# Patient Record
Sex: MALE | Race: WHITE | Employment: FULL TIME | URBAN - METROPOLITAN AREA
[De-identification: names, ages, dates, MRNs, and addresses within clinical notes are randomized per-mention and may not be internally consistent; named-entity substitution may affect disease eponyms.]

---

## 2017-01-10 ENCOUNTER — OFFICE VISIT (OUTPATIENT)
Dept: FAMILY MEDICINE | Facility: CLINIC | Age: 62
End: 2017-01-10
Payer: COMMERCIAL

## 2017-01-10 VITALS
SYSTOLIC BLOOD PRESSURE: 112 MMHG | HEIGHT: 71 IN | TEMPERATURE: 98.2 F | HEART RATE: 58 BPM | BODY MASS INDEX: 26.88 KG/M2 | WEIGHT: 192 LBS | DIASTOLIC BLOOD PRESSURE: 84 MMHG | RESPIRATION RATE: 14 BRPM

## 2017-01-10 DIAGNOSIS — R39.11 URINARY HESITANCY: Primary | ICD-10-CM

## 2017-01-10 LAB
ALBUMIN UR-MCNC: NEGATIVE MG/DL
APPEARANCE UR: CLEAR
BILIRUB UR QL STRIP: NEGATIVE
COLOR UR AUTO: YELLOW
GLUCOSE UR STRIP-MCNC: NEGATIVE MG/DL
HGB UR QL STRIP: NEGATIVE
KETONES UR STRIP-MCNC: NEGATIVE MG/DL
LEUKOCYTE ESTERASE UR QL STRIP: NEGATIVE
NITRATE UR QL: NEGATIVE
PH UR STRIP: 7 PH (ref 5–7)
SP GR UR STRIP: 1.01 (ref 1–1.03)
URN SPEC COLLECT METH UR: NORMAL
UROBILINOGEN UR STRIP-ACNC: 0.2 EU/DL (ref 0.2–1)

## 2017-01-10 PROCEDURE — 84153 ASSAY OF PSA TOTAL: CPT | Performed by: PHYSICIAN ASSISTANT

## 2017-01-10 PROCEDURE — 99213 OFFICE O/P EST LOW 20 MIN: CPT | Performed by: PHYSICIAN ASSISTANT

## 2017-01-10 PROCEDURE — 81003 URINALYSIS AUTO W/O SCOPE: CPT | Performed by: PHYSICIAN ASSISTANT

## 2017-01-10 RX ORDER — TAMSULOSIN HYDROCHLORIDE 0.4 MG/1
0.4 CAPSULE ORAL DAILY
Qty: 30 CAPSULE | Refills: 1 | Status: SHIPPED | OUTPATIENT
Start: 2017-01-10 | End: 2017-03-05

## 2017-01-10 NOTE — MR AVS SNAPSHOT
After Visit Summary   1/10/2017    Pernell Moody    MRN: [id number]           Patient Information     Date Of Birth          1955        Visit Information        Provider Department      1/10/2017 10:15 AM Elmer Horvath PA-C [facility]        Today's Diagnoses     Urinary hesitancy    -  1       Care Instructions      Chronic Prostatitis/Chronic Pelvic Pain Syndrome (CP/CPPS)      With a healthy prostate, urine flows easily through the urethra.   Chronic prostatitis/chronic pelvic pain syndrome (CP/CPPS) is ongoing pain in the area of the prostate gland. The prostate gland is part of the male reproductive system. It sits just below the bladder and surrounds the urethra. The urethra is the tube that takes urine and semen out of the body. CP/CPPS is the most common form of pain of the gland. It is also known as nonbacterial prostatitis. Symptoms such as pain and trouble urinating may come and go.  Causes of CP/CPPS  The exact cause of CP/CPPS isn t known. It may be caused by an infection that comes back again and again. It may be caused by inflammation of the gland. Muscle spasms in the pelvis may be a cause. Other causes of CP/CPPS may include:    Stress that tightens the pelvic muscles    Urine flowing back up into the prostate ducts    Not ejaculating often  In many cases, the cause isn t clear.  Symptoms of CP/CPPS  Some men don t have symptoms. Or, they may have symptoms that come and go. The symptoms can include:    Pain in the genitals and pelvic area    Trouble urinating    Pain while urinating    Pain during or after ejaculation  Diagnosing CP/CPPS  Your health care provider will ask about your medical history and your symptoms. He or she may give you a physical exam, including a rectal exam. Your urine, blood, and semen may be tested for bacteria or certain chemicals. In some cases, you may have other tests. You may have a transrectal ultrasound-guided biopsy.  This is done to take tiny pieces of tissue to look at with a microscope. Or, you may have imaging tests such as a CT scan, MRI, or ultrasound scan. These are done to look at your abdomen and pelvic areas.  Treating CP/CPPS  The goal of treatment is to help relieve symptoms. Treatments can include one or more of these:    Antibiotic medication    Anti-inflammatory or muscle-relaxing medications     Alpha-blocker medications, which relax the muscles in and around the gland    Sitz baths    Prostate massage    Dietary changes    Biofeedback    [id number] [employer]. [address] [place] [address]. All rights reserved. This information is not intended as a substitute for professional medical care. Always follow your healthcare professional's instructions.              Follow-ups after your visit        Who to contact     If you have questions or need follow up information about today's clinic visit or your schedule please contact [facility] directly at [phone].  Normal or non-critical lab and imaging results will be communicated to you by [employer], letter or phone within 4 business days after the clinic has received the results. If you do not hear from us within 7 days, please contact the clinic through [employer] or phone. If you have a critical or abnormal lab result, we will notify you by phone as soon as possible.  Submit refill requests through [employer] or call your pharmacy and they will forward the refill request to us. Please allow 3 business days for your refill to be completed.          Additional Information About Your Visit        [employer] Information     [employer] gives you secure access to your electronic health record. If you see a primary care provider, you can also send messages to your care team and make appointments. If you have questions, please call your primary care clinic.  If you do not have a primary care provider, please call [phone] and they will  "assist you.        Care EveryWhere ID     This is your Care EveryWhere ID. This could be used by other organizations to access your [facility] medical records  [id number]        Your Vitals Were     Pulse Temperature Respirations Height BMI (Body Mass Index)       58 98.2  F (36.8  C) (Oral) 14 5' 10.5\" (1.791 m) 27.15 kg/m2        Blood Pressure from Last 3 Encounters:   01/10/17 112/84   11/09/16 115/75   10/21/16 100/74    Weight from Last 3 Encounters:   01/10/17 192 lb (87.091 kg)   11/09/16 187 lb (84.823 kg)   10/21/16 190 lb (86.183 kg)              We Performed the Following     *UA reflex to Microscopic and Culture ([facility] (except [place] and [place])     Prostate spec antigen screen          Today's Medication Changes          These changes are accurate as of: 1/10/17 11:21 AM.  If you have any questions, ask your nurse or doctor.               Start taking these medicines.        Dose/Directions    tamsulosin 0.4 MG capsule   Commonly known as:  FLOMAX   Used for:  Urinary hesitancy   Started by:  Elmer Horvath PA-C        Dose:  0.4 mg   Take 1 capsule (0.4 mg) by mouth daily   Quantity:  30 capsule   Refills:  1            Where to get your medicines      These medications were sent to [facility] - [facility] [address]  [address], [facility] [address]     Phone:  [phone]    - tamsulosin 0.4 MG capsule             Primary Care Provider Office Phone # Fax #    Elmer Horvath PA-C [phone]       [facility] [address]  [facility] [address]        Thank you!     Thank you for choosing [facility]  for your care. Our goal is always to provide you with excellent care. Hearing back from our patients is one way we can continue to improve our services. Please take a few minutes to complete the written survey that you may " receive in the mail after your visit with us. Thank you!             Your Updated Medication List - Protect others around you: Learn how to safely use, store and throw away your medicines at www.disposemymeds.org.          This list is accurate as of: 1/10/17 11:21 AM.  Always use your most recent med list.                   Brand Name Dispense Instructions for use    atorvastatin 10 MG tablet    LIPITOR    90 tablet    Take 1 tablet (10 mg) by mouth daily       calcium polycarbophil 625 MG tablet    FIBERCON     Take 1 tablet by mouth 4 times daily       COENZYME Q-10 PO      Take 100 mg by mouth daily       cyclobenzaprine 10 MG tablet    FLEXERIL    30 tablet    Take 0.5-1 tablets (5-10 mg) by mouth 3 times daily as needed for muscle spasms       escitalopram 20 MG tablet    LEXAPRO    90 tablet    Take 0.5 tablets (10 mg) by mouth daily       loratadine 10 MG tablet    CLARITIN     Take 10 mg by mouth daily       Magnesium 500 MG Caps      Take by mouth daily       metFORMIN 500 MG 24 hr tablet    GLUCOPHAGE-XR    180 tablet    Take 2 tablets (1,000 mg) by mouth daily (with dinner)       OMEGA-3 FISH OIL PO      Take 1 g by mouth daily       order for DME     1 Device    Equipment being ordered: left thumb spica brace       pantoprazole 20 MG EC tablet    PROTONIX    90 tablet    Take by mouth 30-60 minutes before a meal.       tamsulosin 0.4 MG capsule    FLOMAX    30 capsule    Take 1 capsule (0.4 mg) by mouth daily       traZODone 100 MG tablet    DESYREL    90 tablet    Take 0.5 tablets (50 mg) by mouth nightly as needed       Vitamin B-12 500 MCG Subl      Place under the tongue daily       VITAMIN D3 PO      Take 1,000 Units by mouth daily

## 2017-01-10 NOTE — PATIENT INSTRUCTIONS
Chronic Prostatitis/Chronic Pelvic Pain Syndrome (CP/CPPS)      With a healthy prostate, urine flows easily through the urethra.   Chronic prostatitis/chronic pelvic pain syndrome (CP/CPPS) is ongoing pain in the area of the prostate gland. The prostate gland is part of the male reproductive system. It sits just below the bladder and surrounds the urethra. The urethra is the tube that takes urine and semen out of the body. CP/CPPS is the most common form of pain of the gland. It is also known as nonbacterial prostatitis. Symptoms such as pain and trouble urinating may come and go.  Causes of CP/CPPS  The exact cause of CP/CPPS isn t known. It may be caused by an infection that comes back again and again. It may be caused by inflammation of the gland. Muscle spasms in the pelvis may be a cause. Other causes of CP/CPPS may include:    Stress that tightens the pelvic muscles    Urine flowing back up into the prostate ducts    Not ejaculating often  In many cases, the cause isn t clear.  Symptoms of CP/CPPS  Some men don t have symptoms. Or, they may have symptoms that come and go. The symptoms can include:    Pain in the genitals and pelvic area    Trouble urinating    Pain while urinating    Pain during or after ejaculation  Diagnosing CP/CPPS  Your health care provider will ask about your medical history and your symptoms. He or she may give you a physical exam, including a rectal exam. Your urine, blood, and semen may be tested for bacteria or certain chemicals. In some cases, you may have other tests. You may have a transrectal ultrasound-guided biopsy. This is done to take tiny pieces of tissue to look at with a microscope. Or, you may have imaging tests such as a CT scan, MRI, or ultrasound scan. These are done to look at your abdomen and pelvic areas.  Treating CP/CPPS  The goal of treatment is to help relieve symptoms. Treatments can include one or more of these:    Antibiotic medication    Anti-inflammatory  or muscle-relaxing medications     Alpha-blocker medications, which relax the muscles in and around the gland    Sitz baths    Prostate massage    Dietary changes    Biofeedback    [id number] [employer], [employer]. [address] [place] [address], [place] [address]. All rights reserved. This information is not intended as a substitute for professional medical care. Always follow your healthcare professional's instructions.

## 2017-01-10 NOTE — NURSING NOTE
"Chief Complaint   Patient presents with     Urinary Problem     Initial /84 mmHg  Pulse 58  Temp(Src) 98.2  F (36.8  C) (Oral)  Resp 14  Ht 5' 10.5\" (1.791 m)  Wt 192 lb (87.091 kg)  BMI 27.15 kg/m2 Estimated body mass index is 27.15 kg/(m^2) as calculated from the following:    Height as of this encounter: 5' 10.5\" (1.791 m).    Weight as of this encounter: 192 lb (87.091 kg)..  BP completed using cuff size regular-RA      "

## 2017-01-10 NOTE — PROGRESS NOTES
"  SUBJECTIVE:                                                    Pernell Moody is a 61 year old male who presents to clinic today for the following health issues:      Genitourinary - Male     Onset: couple days     Description:   Dysuria (painful urination): no   Hematuria (blood in urine): no   Frequency: no   Are you urinating at night : YES- once nightly. This is unchanged from baseline  Hesitancy (delay in urine): YES  Retention (unable to empty): YES  Decrease in urinary flow: YES  Incontinence: no     Progression of Symptoms:  same    Accompanying Signs & Symptoms:  Fever: no   Back/Flank pain: no   Urethral discharge: no   Testicle lumps/masses/pain: no   Nausea and/or vomiting: no   Abdominal pain: no    History:   History of frequent UTI's: no   History of kidney stones: no   History of hernias: YES as a child  Personal or Family history of Prostate problems: prostate  Sexually Active: YES             Therapies Tried and outcome: advil      Problem list and histories reviewed & adjusted, as indicated.  Additional history: as documented    Problem list, Medication list, Allergies, and Medical/Social/Surgical histories reviewed in EPIC and updated as appropriate.    ROS:  Constitutional, HEENT, cardiovascular, pulmonary, gi and gu systems are negative, except as otherwise noted.    OBJECTIVE:                                                    /84 mmHg  Pulse 58  Temp(Src) 98.2  F (36.8  C) (Oral)  Resp 14  Ht 5' 10.5\" (1.791 m)  Wt 192 lb (87.091 kg)  BMI 27.15 kg/m2  Body mass index is 27.15 kg/(m^2).  GENERAL: healthy, alert and no distress  RESP: lungs clear to auscultation - no rales, rhonchi or wheezes  CV: regular rates and rhythm, normal S1 S2, no S3 or S4 and no murmur, click or rub  ABDOMEN: soft, nontender, no hepatosplenomegaly, no masses and bowel sounds normal   (male): 1 testicle present on right. No left testicle noted. Otherwise, no hernias and penis normal without urethral " discharge. No tenderness to palpation   RECTAL (male): normal sphincter tone, no rectal masses and prostate of normal size for age, smooth, nontender without masses/nodules  BACK: no CVA tenderness, no paralumbar tenderness  PSYCH: mentation appears normal, affect normal/bright    Diagnostic Test Results:  Results for orders placed or performed in visit on 01/10/17 (from the past 24 hour(s))   *UA reflex to Microscopic and Culture ([facility] (except [place] and [place])   Result Value Ref Range    Color Urine Yellow     Appearance Urine Clear     Glucose Urine Negative NEG mg/dL    Bilirubin Urine Negative NEG    Ketones Urine Negative NEG mg/dL    Specific Gravity Urine 1.015 1.003 - 1.035    Blood Urine Negative NEG    pH Urine 7.0 5.0 - 7.0 pH    Protein Albumin Urine Negative NEG mg/dL    Urobilinogen Urine 0.2 0.2 - 1.0 EU/dL    Nitrite Urine Negative NEG    Leukocyte Esterase Urine Negative NEG    Source Midstream Urine         ASSESSMENT/PLAN:                                                      (R39.11) Urinary hesitancy  (primary encounter diagnosis)  Comment: ua negative for signs of infection. Prostate appears normal on exam without tenderness to palpation. Flare of mild prostatitis is possible. Will give flomax daily while having symptoms and obtain PSA. Pending results of PSA, may treat with course of cipro vs have see urology. Will follow up via phone tomorrow.   Plan: *UA reflex to Microscopic and Culture         ([facility] (except         [place] and [place]), tamsulosin (FLOMAX)         0.4 MG capsule, Prostate spec antigen screen        -Medication use and side effects discussed with the patient. Patient is in complete understanding and agreement with plan.         Follow up: pending psa    Elmer Horvath PA-C  [facility]

## 2017-01-11 LAB — PSA SERPL-ACNC: 0.6 UG/L (ref 0–4)

## 2017-01-12 ENCOUNTER — MYC MEDICAL ADVICE (OUTPATIENT)
Dept: FAMILY MEDICINE | Facility: CLINIC | Age: 62
End: 2017-01-12

## 2017-03-05 DIAGNOSIS — R39.11 URINARY HESITANCY: ICD-10-CM

## 2017-03-05 DIAGNOSIS — R73.03 PREDIABETES: ICD-10-CM

## 2017-03-06 ENCOUNTER — MYC MEDICAL ADVICE (OUTPATIENT)
Dept: FAMILY MEDICINE | Facility: CLINIC | Age: 62
End: 2017-03-06

## 2017-03-06 DIAGNOSIS — K21.9 GASTROESOPHAGEAL REFLUX DISEASE, ESOPHAGITIS PRESENCE NOT SPECIFIED: ICD-10-CM

## 2017-03-06 RX ORDER — PANTOPRAZOLE SODIUM 20 MG/1
TABLET, DELAYED RELEASE ORAL
Qty: 90 TABLET | Refills: 3 | Status: CANCELLED | OUTPATIENT
Start: 2017-03-06

## 2017-03-06 NOTE — TELEPHONE ENCOUNTER
metFORMIN (GLUCOPHAGE-XR) 500 MG 24 hr tablet         Last Written Prescription Date: 2/24/16  Last Fill Quantity: 180, # refills: 3  Last Office Visit with [facility], [facility] or [facility] prescribing provider:  1/10/2017        BP Readings from Last 3 Encounters:   01/10/17 112/84   11/09/16 115/75   10/21/16 100/74     No results found for: MICROL  No results found for: MICROALBUMIN  Creatinine   Date Value Ref Range Status   11/09/2016 1.01 0.66 - 1.25 mg/dL Final   ]  GFR Estimate   Date Value Ref Range Status   11/09/2016 75 >60 mL/min/1.7m2 Final     Comment:     Non  GFR Calc   02/24/2016 83 >60 mL/min/1.7m2 Final     Comment:     Non  GFR Calc   10/31/2015 74 >60 mL/min/1.7m2 Final     Comment:     Non  GFR Calc     GFR Estimate If Black   Date Value Ref Range Status   11/09/2016 >90   GFR Calc   >60 mL/min/1.7m2 Final   02/24/2016 >90   GFR Calc   >60 mL/min/1.7m2 Final   10/31/2015 >90   GFR Calc   >60 mL/min/1.7m2 Final     Lab Results   Component Value Date    CHOL 154 11/09/2016     Lab Results   Component Value Date    HDL 48 11/09/2016     Lab Results   Component Value Date    LDL 86 11/09/2016     Lab Results   Component Value Date    TRIG 99 11/09/2016     Lab Results   Component Value Date    CHOLHDLRATIO 3.0 10/31/2015     Lab Results   Component Value Date    AST 21 11/09/2016     Lab Results   Component Value Date    ALT 29 11/09/2016     Lab Results   Component Value Date    A1C 5.7 11/09/2016    A1C 5.5 02/24/2016    A1C 5.6 10/31/2014    A1C 5.6 10/15/2012    A1C 5.6 11/09/2011     Potassium   Date Value Ref Range Status   11/09/2016 4.8 3.4 - 5.3 mmol/L Final     ________________________________________________________________________________    tamsulosin (FLOMAX) 0.4 MG capsule  Last Written Prescription Date: 1/10/17  Last Fill Quantity: 30,  # refills: 1   Last Office Visit with [facility], [facility] or [facility]  prescribing provider:  1/10/2017                                              Merlene Spivey, MARIELLET

## 2017-03-07 RX ORDER — METFORMIN HCL 500 MG
TABLET, EXTENDED RELEASE 24 HR ORAL
Qty: 180 TABLET | Refills: 1 | Status: SHIPPED | OUTPATIENT
Start: 2017-03-07 | End: 2017-08-27

## 2017-03-07 RX ORDER — TAMSULOSIN HYDROCHLORIDE 0.4 MG/1
CAPSULE ORAL
Qty: 90 CAPSULE | Refills: 1 | Status: SHIPPED | OUTPATIENT
Start: 2017-03-07 | End: 2017-08-27

## 2017-03-07 RX ORDER — PANTOPRAZOLE SODIUM 40 MG/1
40 TABLET, DELAYED RELEASE ORAL DAILY
Qty: 90 TABLET | Refills: 3 | Status: SHIPPED | OUTPATIENT
Start: 2017-03-07 | End: 2017-11-16

## 2017-04-10 ENCOUNTER — MYC MEDICAL ADVICE (OUTPATIENT)
Dept: FAMILY MEDICINE | Facility: CLINIC | Age: 62
End: 2017-04-10

## 2017-04-10 DIAGNOSIS — G47.9 SLEEP DISORDER: Primary | ICD-10-CM

## 2017-05-24 ENCOUNTER — TELEPHONE (OUTPATIENT)
Dept: FAMILY MEDICINE | Facility: CLINIC | Age: 62
End: 2017-05-24

## 2017-05-24 DIAGNOSIS — G47.9 SLEEP DISORDER: ICD-10-CM

## 2017-05-25 NOTE — TELEPHONE ENCOUNTER
Trazodone 100mg tab        Last Written Prescription Date: 05/12/16  Last Fill Quantity: 90; # refills: 1  Last Office Visit with [facility], [facility] or [facility] prescribing provider:  01/10/17 Elmer Horvath        Last PHQ-9 score on record=   PHQ-9 SCORE 11/9/2016   Total Score 0       Lab Results   Component Value Date    AST 21 11/09/2016     Lab Results   Component Value Date    ALT 29 11/09/2016

## 2017-05-26 NOTE — TELEPHONE ENCOUNTER
Do you want to refill as patient was having issues with sleep last month?  Mariaelena Rodriguez RN, BSN

## 2017-05-30 RX ORDER — TRAZODONE HYDROCHLORIDE 100 MG/1
TABLET ORAL
Qty: 90 TABLET | Refills: 0 | Status: SHIPPED | OUTPATIENT
Start: 2017-05-30 | End: 2017-11-16

## 2017-05-30 NOTE — TELEPHONE ENCOUNTER
Refilled, but I do not see patient has scheduled appointment for sleep specialist yet. Please call and obtain update on sleep concerns.     Thanks,    Anmol Horvath, ROSE

## 2017-08-27 DIAGNOSIS — R39.11 URINARY HESITANCY: ICD-10-CM

## 2017-08-27 DIAGNOSIS — R73.03 PREDIABETES: ICD-10-CM

## 2017-08-27 DIAGNOSIS — F43.23 ADJUSTMENT DISORDER WITH MIXED ANXIETY AND DEPRESSED MOOD: ICD-10-CM

## 2017-08-28 NOTE — TELEPHONE ENCOUNTER
metFORMIN (GLUCOPHAGE-XR) 500 MG 24 hr tablet     Sig: TAKE 2 TABLETS BY MOUTH DAILY WITH DINNER AS DIRECTED        Last Written Prescription Date: 3/7/17  Last Fill Quantity: 180, # refills: 1  Last Office Visit with [facility], P or [facility] prescribing provider:  1/10/2017        BP Readings from Last 3 Encounters:   01/10/17 112/84   11/09/16 115/75   10/21/16 100/74     No results found for: MICROL  No results found for: UMALCR  Creatinine   Date Value Ref Range Status   11/09/2016 1.01 0.66 - 1.25 mg/dL Final   ]  GFR Estimate   Date Value Ref Range Status   11/09/2016 75 >60 mL/min/1.7m2 Final     Comment:     Non  GFR Calc   02/24/2016 83 >60 mL/min/1.7m2 Final     Comment:     Non  GFR Calc   10/31/2015 74 >60 mL/min/1.7m2 Final     Comment:     Non  GFR Calc     GFR Estimate If Black   Date Value Ref Range Status   11/09/2016 >90   GFR Calc   >60 mL/min/1.7m2 Final   02/24/2016 >90   GFR Calc   >60 mL/min/1.7m2 Final   10/31/2015 >90   GFR Calc   >60 mL/min/1.7m2 Final     Lab Results   Component Value Date    CHOL 154 11/09/2016     Lab Results   Component Value Date    HDL 48 11/09/2016     Lab Results   Component Value Date    LDL 86 11/09/2016     Lab Results   Component Value Date    TRIG 99 11/09/2016     Lab Results   Component Value Date    CHOLHDLRATIO 3.0 10/31/2015     Lab Results   Component Value Date    AST 21 11/09/2016     Lab Results   Component Value Date    ALT 29 11/09/2016     Lab Results   Component Value Date    A1C 5.7 11/09/2016    A1C 5.5 02/24/2016    A1C 5.6 10/31/2014    A1C 5.6 10/15/2012    A1C 5.6 11/09/2011     Potassium   Date Value Ref Range Status   11/09/2016 4.8 3.4 - 5.3 mmol/L Final     ________________________________________________________________________________    escitalopram (LEXAPRO) 20 MG tablet     Last Written Prescription Date: 11/9/16  Last Fill Quantity: 90, #  refills: 1  Last Office Visit with [facility] primary care provider:  1/10/2017        Last PHQ-9 score on record=   PHQ-9 SCORE 11/9/2016   Total Score -   Total Score 0       ________________________________________________________________________________    tamsulosin (FLOMAX) 0.4 MG capsule  Last Written Prescription Date: 3/7/17  Last Fill Quantity: 90,  # refills: 1   Last Office Visit with [facility], [facility] or [facility] prescribing provider:  1/10/2017                                              LUCIE [place]   August 28, 2017  10:45 AM

## 2017-08-29 RX ORDER — ESCITALOPRAM OXALATE 20 MG/1
TABLET ORAL
Qty: 45 TABLET | Refills: 0 | Status: SHIPPED | OUTPATIENT
Start: 2017-08-29 | End: 2017-11-16

## 2017-08-29 RX ORDER — METFORMIN HCL 500 MG
TABLET, EXTENDED RELEASE 24 HR ORAL
Qty: 180 TABLET | Refills: 0 | Status: SHIPPED | OUTPATIENT
Start: 2017-08-29 | End: 2017-11-20

## 2017-08-29 RX ORDER — TAMSULOSIN HYDROCHLORIDE 0.4 MG/1
CAPSULE ORAL
Qty: 90 CAPSULE | Refills: 0 | Status: SHIPPED | OUTPATIENT
Start: 2017-08-29 | End: 2017-11-20

## 2017-08-29 NOTE — TELEPHONE ENCOUNTER
Prescriptions approved per [facility] Refill Protocol.  We sent ELEAZAR-7 and PHQ-9 questionnaires in [facility]  Dhruv Cardona RN

## 2017-11-16 DIAGNOSIS — F43.23 ADJUSTMENT DISORDER WITH MIXED ANXIETY AND DEPRESSED MOOD: ICD-10-CM

## 2017-11-16 DIAGNOSIS — G47.9 SLEEP DISORDER: ICD-10-CM

## 2017-11-16 DIAGNOSIS — K21.9 GASTROESOPHAGEAL REFLUX DISEASE, ESOPHAGITIS PRESENCE NOT SPECIFIED: ICD-10-CM

## 2017-11-16 DIAGNOSIS — E78.5 HYPERLIPIDEMIA LDL GOAL <130: ICD-10-CM

## 2017-11-16 RX ORDER — PANTOPRAZOLE SODIUM 40 MG/1
40 TABLET, DELAYED RELEASE ORAL DAILY
Qty: 30 TABLET | Refills: 0 | Status: SHIPPED | OUTPATIENT
Start: 2017-11-16 | End: 2017-12-17

## 2017-11-16 RX ORDER — ATORVASTATIN CALCIUM 10 MG/1
10 TABLET, FILM COATED ORAL DAILY
Qty: 30 TABLET | Refills: 0 | Status: SHIPPED | OUTPATIENT
Start: 2017-11-16 | End: 2017-12-17

## 2017-11-16 RX ORDER — ESCITALOPRAM OXALATE 20 MG/1
TABLET ORAL
Qty: 45 TABLET | Refills: 0 | Status: SHIPPED | OUTPATIENT
Start: 2017-11-16 | End: 2018-02-15

## 2017-11-16 RX ORDER — TRAZODONE HYDROCHLORIDE 100 MG/1
TABLET ORAL
Qty: 45 TABLET | Refills: 0 | Status: SHIPPED | OUTPATIENT
Start: 2017-11-16

## 2017-11-16 NOTE — TELEPHONE ENCOUNTER
PCP:    The Pt reports he is currently in [place] (potentially moving). He does not know when he will be returning to [place] (if ever).   The Pt is coming due for an OV with you, and is now overdue for Lipid labs.     The Pt is requesting a 30 day supply of the medications to get him through until he can establish with a provider where he is in [place].  The Pt may be returning to [place], however it is unknown when this will occur.     Medications requested are pended for 30 day supplies. Please review.     Susie Williamson RN -- [facility]

## 2017-11-20 DIAGNOSIS — R73.03 PREDIABETES: ICD-10-CM

## 2017-11-20 DIAGNOSIS — R39.11 URINARY HESITANCY: ICD-10-CM

## 2017-11-20 RX ORDER — TAMSULOSIN HYDROCHLORIDE 0.4 MG/1
CAPSULE ORAL
Qty: 30 CAPSULE | Refills: 0 | Status: SHIPPED | OUTPATIENT
Start: 2017-11-20

## 2017-11-20 RX ORDER — METFORMIN HCL 500 MG
TABLET, EXTENDED RELEASE 24 HR ORAL
Qty: 60 TABLET | Refills: 0 | Status: SHIPPED | OUTPATIENT
Start: 2017-11-20

## 2017-11-20 NOTE — TELEPHONE ENCOUNTER
Patient calling again and states should have said when he called the other day he also needs refill of Flomax and Metformin.  Aware needs new provider in [place] prior to further refills.  Filled Flomax PSO.  Metformin not PSO diagnosis.  Advised would route that to [facility] for one time fill.  Patient agrees with plan.  JACEK Merino Rachel M, RN        11/16/17 12:00 PM   Note      PCP:     The Pt reports he is currently in [place] (potentially moving). He does not know when he will be returning to [place] (if ever).   The Pt is coming due for an OV with you, and is now overdue for Lipid labs.      The Pt is requesting a 30 day supply of the medications to get him through until he can establish with a provider where he is in [place].  The Pt may be returning to [place], however it is unknown when this will occur.      Medications requested are pended for 30 day supplies. Please review.      Susie Williamson, RN -- [facility]

## 2017-12-17 ENCOUNTER — TELEPHONE (OUTPATIENT)
Dept: FAMILY MEDICINE | Facility: CLINIC | Age: 62
End: 2017-12-17

## 2017-12-17 DIAGNOSIS — K21.9 GASTROESOPHAGEAL REFLUX DISEASE, ESOPHAGITIS PRESENCE NOT SPECIFIED: ICD-10-CM

## 2017-12-17 DIAGNOSIS — E78.5 HYPERLIPIDEMIA LDL GOAL <130: ICD-10-CM

## 2017-12-20 NOTE — TELEPHONE ENCOUNTER
LDL Cholesterol Calculated   Date Value Ref Range Status   11/09/2016 86 <100 mg/dL Final     Comment:     Desirable:       <100 mg/dl   Routing refill request to provider for review/approval because:  Labs not current

## 2017-12-20 NOTE — TELEPHONE ENCOUNTER
Requested Prescriptions   Pending Prescriptions Disp Refills     atorvastatin (LIPITOR) 10 MG tablet [Pharmacy Med Name: ATORVASTATIN 10MG TABLETS] 30 tablet 0    Last Written Prescription Date:  11/16/17  Last Fill Quantity: 30,  # refills: 0   Last Office Visit with [facility] or [facility] prescribing provider:  1/10/2017   Future Office Visit:      Sig: TAKE 1 TABLET(10 MG) BY MOUTH DAILY    Statins Protocol Failed    12/17/2017  5:12 PM       Failed - LDL on file in past 12 months    Recent Labs   Lab Test  11/09/16   0829   LDL  86          Passed - No abnormal creatine kinase in past 12 months    No lab results found.       Passed - Recent or future visit with authorizing provider    Patient had office visit in the last year or has a visit in the next 30 days with authorizing provider.  See chart review.        Passed - Patient is age 18 or older   ________________________________________________________________________________       pantoprazole (PROTONIX) 40 MG EC tablet [Pharmacy Med Name: PANTOPRAZOLE 40MG TABLETS] 30 tablet 0    Last Written Prescription Date:  11/16/17  Last Fill Quantity: 30,  # refills: 0   Last Office Visit with [facility], [facility] or [facility] prescribing provider:  1/10/2017   Future Office Visit:      Sig: TAKE 1 TABLET(40 MG) BY MOUTH DAILY 30 TO 60 MINUTES BEFORE A MEAL    PPI Protocol Passed    12/17/2017  5:12 PM       Passed - Not on Clopidogrel (unless Pantoprazole ordered)       Passed - No diagnosis of osteoporosis on record       Passed - Recent or future visit with authorizing provider's specialty    Patient had office visit in the last year or has a visit in the next 30 days with authorizing provider.  See chart review.          Passed - Patient is age 18 or older

## 2017-12-21 RX ORDER — ATORVASTATIN CALCIUM 10 MG/1
TABLET, FILM COATED ORAL
Qty: 30 TABLET | Refills: 0 | Status: SHIPPED | OUTPATIENT
Start: 2017-12-21

## 2017-12-21 RX ORDER — PANTOPRAZOLE SODIUM 40 MG/1
TABLET, DELAYED RELEASE ORAL
Qty: 30 TABLET | Refills: 0 | Status: SHIPPED | OUTPATIENT
Start: 2017-12-21

## 2018-02-15 DIAGNOSIS — F43.23 ADJUSTMENT DISORDER WITH MIXED ANXIETY AND DEPRESSED MOOD: ICD-10-CM

## 2018-02-15 NOTE — LETTER
February 21, 2018      [facility]  [address] [facility] [id number]          Dear Pernell,     We sent a one month refill of escitalopram to your pharmacy yesterday. This is a reminder from   Anmol Horvath PA-C to schedule a fasting physical before further refills.  Your last annual physical was November 9, 2016.     Please call if you have any questions.     Sincerely,    JACEK Bartlett

## 2018-02-16 NOTE — TELEPHONE ENCOUNTER
"Requested Prescriptions   Pending Prescriptions Disp Refills     escitalopram (LEXAPRO) 20 MG tablet [Pharmacy Med Name: ESCITALOPRAM 20MG TABLETS]    Last Written Prescription Date:  11/16/17  Last Fill Quantity: 45 tablet,  # refills: 0   Last office visit: 1/10/2017 with prescribing provider:  Alexandru 1/10/17   Future Office Visit:   45 tablet 0     Sig: TAKE 1/2 TABLET BY MOUTH DAILY    SSRIs Protocol Failed    2/15/2018  8:56 PM       Failed - Recent or future visit with authorizing provider    Patient had office visit in the last year or has a visit in the next 30 days with authorizing provider.  See \"Patient Info\" tab in inbasket, or \"Choose Columns\" in Meds & Orders section of the refill encounter.            Passed - Patient is age 18 or older        "

## 2018-02-19 NOTE — TELEPHONE ENCOUNTER
Left message to call back.    12/17/17 note : pt due then for fasting PE.  Said he would call back but has not.   Anmol, 15 or 30 escitalopram with letter?    (not reading [employer] messages)   Dhruv Cardona RN

## 2018-02-20 RX ORDER — ESCITALOPRAM OXALATE 20 MG/1
TABLET ORAL
Qty: 15 TABLET | Refills: 0 | Status: SHIPPED | OUTPATIENT
Start: 2018-02-20

## 2018-03-15 ENCOUNTER — TELEPHONE (OUTPATIENT)
Dept: FAMILY MEDICINE | Facility: CLINIC | Age: 63
End: 2018-03-15

## 2018-03-15 NOTE — TELEPHONE ENCOUNTER
Patient calling and states had shingles shot and wondering if old one or new one.  Advised he got the old one and new one may be available around June here.  Advised can get the new one also when available.  Will check at future visit if we have available.  Eli Fay RN

## 2019-09-30 ENCOUNTER — HEALTH MAINTENANCE LETTER (OUTPATIENT)
Age: 64
End: 2019-09-30

## 2020-10-23 ENCOUNTER — TELEPHONE (OUTPATIENT)
Dept: FAMILY MEDICINE | Facility: CLINIC | Age: 65
End: 2020-10-23

## 2020-10-23 NOTE — TELEPHONE ENCOUNTER
[facility] ([place]) calls ([phone])    Requesting patients most recent ECG results, states fax request was sent to [phone], please advise if this form was received    Gualberto Gutierrez RN

## 2020-10-26 NOTE — TELEPHONE ENCOUNTER
Spoke with pt he states he will e-mail DARYL to medical records.  Elisa Rouse MA on 10/26/2020 at 10:40 AM

## 2021-01-15 ENCOUNTER — HEALTH MAINTENANCE LETTER (OUTPATIENT)
Age: 66
End: 2021-01-15

## 2021-10-24 ENCOUNTER — HEALTH MAINTENANCE LETTER (OUTPATIENT)
Age: 66
End: 2021-10-24

## 2021-11-12 ENCOUNTER — MYC MEDICAL ADVICE (OUTPATIENT)
Dept: FAMILY MEDICINE | Facility: CLINIC | Age: 66
End: 2021-11-12
Payer: COMMERCIAL

## 2021-11-22 ENCOUNTER — MYC MEDICAL ADVICE (OUTPATIENT)
Dept: FAMILY MEDICINE | Facility: CLINIC | Age: 66
End: 2021-11-22
Payer: COMMERCIAL

## 2021-11-22 NOTE — TELEPHONE ENCOUNTER
My-chart message sent    Neena Hwang/[facility]  Lis---[facility]

## 2022-02-13 ENCOUNTER — HEALTH MAINTENANCE LETTER (OUTPATIENT)
Age: 67
End: 2022-02-13

## 2022-10-16 ENCOUNTER — HEALTH MAINTENANCE LETTER (OUTPATIENT)
Age: 67
End: 2022-10-16

## 2023-03-26 ENCOUNTER — HEALTH MAINTENANCE LETTER (OUTPATIENT)
Age: 68
End: 2023-03-26

## 2024-06-01 ENCOUNTER — HEALTH MAINTENANCE LETTER (OUTPATIENT)
Age: 69
End: 2024-06-01